# Patient Record
Sex: FEMALE | Race: OTHER
[De-identification: names, ages, dates, MRNs, and addresses within clinical notes are randomized per-mention and may not be internally consistent; named-entity substitution may affect disease eponyms.]

---

## 2019-04-16 ENCOUNTER — HOSPITAL ENCOUNTER (EMERGENCY)
Dept: HOSPITAL 94 - ER | Age: 44
Discharge: LEFT BEFORE DISCHARGE | End: 2019-04-16
Payer: MEDICARE

## 2019-04-16 VITALS — BODY MASS INDEX: 26.63 KG/M2 | WEIGHT: 150.31 LBS | HEIGHT: 63 IN

## 2019-04-16 VITALS — SYSTOLIC BLOOD PRESSURE: 133 MMHG | DIASTOLIC BLOOD PRESSURE: 98 MMHG

## 2019-04-16 DIAGNOSIS — R45.851: Primary | ICD-10-CM

## 2019-04-16 DIAGNOSIS — Z53.21: ICD-10-CM

## 2019-04-16 NOTE — NUR
UNABLE TO LOCATE PT., AFTER ATTEMPTING TO ROOM. ASSIGNED OF STAFF TO SEARCH 
AREA. PLACED CALL TO NUMBER ON FILE. LEFT MEASSAGE EXPRESSING CONCERN FOR PT 
WELL BEING AND URGING HER RETURN, GAVE DIRECT # TO [facility] STATION AND MY NAME AS 
A CONTACT.

## 2019-04-16 NOTE — NUR
PT COULD NOT BE LOCATED WHEN ATTEMPTING TO ADMIT, PT NOT IN LOBBY, NOT IN 
PARKING LOT.  ATTEMPTED TO CALL PT WITH NUMBER LISTED BUT THERE WAS NO ANSWER 
AND VOICEMAIL DOES NOT IDENTIFY OWNER OF PHONE SO UNABLE TO LEAVE MESSAGE.

## 2021-10-24 ENCOUNTER — HOSPITAL ENCOUNTER (EMERGENCY)
Dept: HOSPITAL 94 - ER | Age: 46
Discharge: HOME | End: 2021-10-24
Payer: MEDICARE

## 2021-10-24 VITALS — SYSTOLIC BLOOD PRESSURE: 114 MMHG | DIASTOLIC BLOOD PRESSURE: 63 MMHG

## 2021-10-24 VITALS — BODY MASS INDEX: 27.37 KG/M2 | WEIGHT: 160.34 LBS | HEIGHT: 64 IN
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
## 2021-10-24 DIAGNOSIS — S51.811A: Primary | ICD-10-CM

## 2021-10-24 DIAGNOSIS — G89.29: ICD-10-CM
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
## 2021-10-24 DIAGNOSIS — Z79.899: ICD-10-CM
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
## 2021-10-24 PROCEDURE — 99282 EMERGENCY DEPT VISIT SF MDM: CPT

## 2021-10-24 PROCEDURE — 12001 RPR S/N/AX/GEN/TRNK 2.5CM/<: CPT

## 2021-10-25 ENCOUNTER — HOSPITAL ENCOUNTER (EMERGENCY)
Dept: HOSPITAL 94 - ER | Age: 46
Discharge: LEFT BEFORE DISCHARGE | End: 2021-10-25
Payer: MEDICARE

## 2021-10-25 VITALS — WEIGHT: 165.35 LBS | BODY MASS INDEX: 29.3 KG/M2 | HEIGHT: 63 IN

## 2021-10-25 VITALS — SYSTOLIC BLOOD PRESSURE: 145 MMHG | DIASTOLIC BLOOD PRESSURE: 57 MMHG

## 2021-10-25 DIAGNOSIS — Z53.21: ICD-10-CM

## 2021-10-25 DIAGNOSIS — R58: Primary | ICD-10-CM

## 2025-01-03 ENCOUNTER — HOSPITAL ENCOUNTER (OUTPATIENT)
Dept: HOSPITAL 94 - MRI | Age: 50
Discharge: HOME | End: 2025-01-03
Attending: STUDENT IN AN ORGANIZED HEALTH CARE EDUCATION/TRAINING PROGRAM
Payer: MEDICARE

## 2025-01-03 DIAGNOSIS — M47.816: Primary | ICD-10-CM

## 2025-01-03 DIAGNOSIS — M46.1: ICD-10-CM

## 2025-01-03 DIAGNOSIS — M51.26: ICD-10-CM

## 2025-01-03 DIAGNOSIS — M54.41: ICD-10-CM

## 2025-01-03 DIAGNOSIS — M48.061: ICD-10-CM

## 2025-01-03 PROCEDURE — 72148 MRI LUMBAR SPINE W/O DYE: CPT
